# Patient Record
Sex: FEMALE | URBAN - METROPOLITAN AREA
[De-identification: names, ages, dates, MRNs, and addresses within clinical notes are randomized per-mention and may not be internally consistent; named-entity substitution may affect disease eponyms.]

---

## 2018-10-31 ENCOUNTER — APPOINTMENT (OUTPATIENT)
Dept: URBAN - METROPOLITAN AREA CLINIC 237 | Age: 68
Setting detail: DERMATOLOGY
End: 2018-10-31

## 2018-10-31 DIAGNOSIS — L82.1 OTHER SEBORRHEIC KERATOSIS: ICD-10-CM

## 2018-10-31 PROCEDURE — OTHER MIPS QUALITY: OTHER

## 2018-10-31 PROCEDURE — OTHER COUNSELING: OTHER

## 2018-10-31 PROCEDURE — 99201: CPT

## 2018-10-31 PROCEDURE — OTHER EDUCATIONAL RESOURCES PROVIDED: OTHER

## 2018-10-31 PROCEDURE — OTHER REASSURANCE: OTHER

## 2018-10-31 ASSESSMENT — LOCATION SIMPLE DESCRIPTION DERM: LOCATION SIMPLE: LEFT FOREHEAD

## 2018-10-31 ASSESSMENT — LOCATION DETAILED DESCRIPTION DERM
LOCATION DETAILED: LEFT SUPERIOR LATERAL FOREHEAD
LOCATION DETAILED: LEFT SUPERIOR MEDIAL FOREHEAD

## 2018-10-31 ASSESSMENT — LOCATION ZONE DERM: LOCATION ZONE: FACE

## 2018-10-31 NOTE — PROCEDURE: COUNSELING
Detail Level: Simple
Patient Specific Counseling (Will Not Stick From Patient To Patient): Advised if lesions ever become itchy or irritated from grooming, can RTC for LN2.  Pt informed procedure would be cosmetic if not symptomatic.

## 2018-10-31 NOTE — PROCEDURE: MIPS QUALITY
Detail Level: Detailed
Quality 110: Preventive Care And Screening: Influenza Immunization: Influenza Immunization Administered during Influenza season
Quality 111:Pneumonia Vaccination Status For Older Adults: Pneumococcal Vaccination Previously Received
Quality 400a: One-Time Screening For Hepatitis C Virus (Hcv) For All Patients: Patient received one-time screening for HCV infection

## 2018-12-28 ENCOUNTER — APPOINTMENT (OUTPATIENT)
Dept: URBAN - METROPOLITAN AREA CLINIC 237 | Age: 68
Setting detail: DERMATOLOGY
End: 2018-12-28

## 2018-12-28 DIAGNOSIS — D485 NEOPLASM OF UNCERTAIN BEHAVIOR OF SKIN: ICD-10-CM

## 2018-12-28 PROBLEM — D48.5 NEOPLASM OF UNCERTAIN BEHAVIOR OF SKIN: Status: ACTIVE | Noted: 2018-12-28

## 2018-12-28 PROCEDURE — OTHER PRESCRIPTION: OTHER

## 2018-12-28 PROCEDURE — 99213 OFFICE O/P EST LOW 20 MIN: CPT

## 2018-12-28 PROCEDURE — OTHER PATIENT SPECIFIC COUNSELING: OTHER

## 2018-12-28 PROCEDURE — OTHER TREATMENT REGIMEN: OTHER

## 2018-12-28 PROCEDURE — OTHER ORDER TESTS: OTHER

## 2018-12-28 PROCEDURE — OTHER PHOTO-DOCUMENTATION: OTHER

## 2018-12-28 RX ORDER — MUPIROCIN 20 MG/G
OINTMENT TOPICAL
Qty: 1 | Refills: 0 | Status: ERX | COMMUNITY
Start: 2018-12-28

## 2018-12-28 ASSESSMENT — LOCATION ZONE DERM: LOCATION ZONE: FACE

## 2018-12-28 ASSESSMENT — LOCATION SIMPLE DESCRIPTION DERM: LOCATION SIMPLE: LEFT CHEEK

## 2018-12-28 ASSESSMENT — LOCATION DETAILED DESCRIPTION DERM: LOCATION DETAILED: LEFT SUPERIOR CENTRAL MALAR CHEEK

## 2018-12-28 NOTE — PROCEDURE: TREATMENT REGIMEN
Initiate Treatment: Mupirocin alexandria tid x 10 days \\nHC 1% bid prn itchiness
Detail Level: Simple

## 2018-12-28 NOTE — HPI: SKIN LESION
Is This A New Presentation, Or A Follow-Up?: Skin Lesion
Additional History: Pt using H2O2, Noxema, HC 1%. Lesion appeared out of no where, pt denies trauma to the area.

## 2018-12-28 NOTE — PROCEDURE: PATIENT SPECIFIC COUNSELING
MD uncertain what this could be. 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 \\n\\nAdvised to apply treatment with q-tip in case of an infection.
Detail Level: Zone

## 2018-12-31 ENCOUNTER — APPOINTMENT (OUTPATIENT)
Dept: URBAN - METROPOLITAN AREA CLINIC 237 | Age: 68
Setting detail: DERMATOLOGY
End: 2018-12-31

## 2018-12-31 DIAGNOSIS — B00.1 HERPESVIRAL VESICULAR DERMATITIS: ICD-10-CM

## 2018-12-31 PROCEDURE — OTHER PATIENT SPECIFIC COUNSELING: OTHER

## 2018-12-31 NOTE — PROCEDURE: PATIENT SPECIFIC COUNSELING
MD spoke to pt on 12/31/18. advised culture grew HSV. Pt is doing a lot better. only PIH. Advised to RTC if that or lip HSV flares.
Detail Level: Zone

## 2025-04-05 NOTE — PROCEDURE: PHOTO-DOCUMENTATION
Detail Level: Zone
Details (Free Text): 1
Photo Preface (Leave Blank If You Do Not Want): Photographs were obtained today
No